# Patient Record
Sex: FEMALE | Race: WHITE | NOT HISPANIC OR LATINO | Employment: OTHER | URBAN - METROPOLITAN AREA
[De-identification: names, ages, dates, MRNs, and addresses within clinical notes are randomized per-mention and may not be internally consistent; named-entity substitution may affect disease eponyms.]

---

## 2017-01-24 ENCOUNTER — TELEPHONE (OUTPATIENT)
Dept: UROLOGY | Facility: CLINIC | Age: 42
End: 2017-01-24

## 2017-01-24 NOTE — TELEPHONE ENCOUNTER
----- Message from Melany Schwab sent at 1/24/2017  3:35 PM CST -----  Contact: pt  _  1st Request  _  2nd Request  _  3rd Request        Who: pt    Why: pt needs advise on her condition. Made her an appt but she is not sure is she needs to see dr diez or where to go from here. Put her on the wait list because her appt is not until 2/22/17    What Number to Call Back:[phone]    When to Expect a call back: (Before the end of the day)   -- if call after 3:00 call back will be tomorrow.

## 2017-02-17 ENCOUNTER — TELEPHONE (OUTPATIENT)
Dept: UROLOGY | Facility: CLINIC | Age: 42
End: 2017-02-17

## 2017-02-17 NOTE — TELEPHONE ENCOUNTER
----- Message from Cheyenne Brooks sent at 2/17/2017  1:55 PM CST -----  _  1st Request  _  2nd Request  _  3rd Request        Who: Devi Azevedo    Why: please call pt concerning her appt on Wednesday, she wants to be admitted into the hospital and i explained to the patient we can not admit her over the phone, but she would like to someone in your office.     What Number to Call Back: [phone]    When to Expect a call back: (Before the end of the day)   -- if the call is after 12:00, the call back will be tomorrow.

## 2017-02-17 NOTE — TELEPHONE ENCOUNTER
Patient called stating that she has put on a significant amount of weight and wants to be admitted in the hospital today for testing. She states that she feels like she is under stress and feels like she needs immediate care. I advised the patient that she should seek ER care immediately, but the patient refused stating that she wants either the provider to admit her today or she will wait for her apt. The patient speech was not clear and at some points inaudible. She was unclear about past dx. She states that she thinks she has water toxicity. I was able to find her endocrinologist in a directory, Dr. Lili Broussard ([phone]) and will try to obtain some records for her. Their office is closed today.

## 2017-02-22 ENCOUNTER — TELEPHONE (OUTPATIENT)
Dept: UROLOGY | Facility: CLINIC | Age: 42
End: 2017-02-22

## 2017-02-22 NOTE — TELEPHONE ENCOUNTER
----- Message from Apryl Ennis sent at 2/22/2017  7:55 AM CST -----  Contact: AMANDA POE [[id number]]  x_  1st Request  _  2nd Request  _  3rd Request        Who: AMANDA POE [[id number]]    Why: Patient would like a call back from clinical staff regarding scheduled appt today. Please give patient a call back at your earliest convenience. Thanks!    What Number to Call Back: [phone]    When to Expect a call back: (Before the end of the day)   -- if the call is after 12:00, the call back will be tomorrow.

## 2017-02-22 NOTE — TELEPHONE ENCOUNTER
Spoke to pt advised her we dont treat fluid intake an skin pealing. Pt states she was told that  would admit her to the hospital for these symptoms. i advised her we wouldn't do this. She states she has seen numerous doctors in [facility] an no one there was able to pin point what is wrong with her. i advise her if she was having all these symptoms i can only suggest her to go to the er. After a 20minute called of stressing to go to the er she stated she will come to [place] to go to [facility] er in hope that they will fine out what is wrong with her. les

## 2017-03-23 ENCOUNTER — OFFICE VISIT (OUTPATIENT)
Dept: ENDOCRINOLOGY | Facility: CLINIC | Age: 42
End: 2017-03-23
Payer: MEDICARE

## 2017-03-23 VITALS
WEIGHT: 143.31 LBS | HEART RATE: 97 BPM | HEIGHT: 65 IN | DIASTOLIC BLOOD PRESSURE: 84 MMHG | BODY MASS INDEX: 23.88 KG/M2 | SYSTOLIC BLOOD PRESSURE: 128 MMHG

## 2017-03-23 DIAGNOSIS — K50.919 CROHN'S DISEASE WITH COMPLICATION, UNSPECIFIED GASTROINTESTINAL TRACT LOCATION: ICD-10-CM

## 2017-03-23 DIAGNOSIS — E03.9 PRIMARY HYPOTHYROIDISM: Primary | ICD-10-CM

## 2017-03-23 DIAGNOSIS — G89.29 OTHER CHRONIC PAIN: ICD-10-CM

## 2017-03-23 DIAGNOSIS — E16.1 REACTIVE HYPOGLYCEMIA: ICD-10-CM

## 2017-03-23 DIAGNOSIS — G47.30 SLEEP APNEA, UNSPECIFIED TYPE: ICD-10-CM

## 2017-03-23 DIAGNOSIS — Q79.62 EHLERS-DANLOS SYNDROME TYPE III: ICD-10-CM

## 2017-03-23 DIAGNOSIS — E55.9 VITAMIN D DEFICIENCY DISEASE: ICD-10-CM

## 2017-03-23 DIAGNOSIS — R60.0 EDEMA OF LOWER EXTREMITY, UNSPECIFIED LATERALITY: ICD-10-CM

## 2017-03-23 DIAGNOSIS — M85.80 LOW BONE MASS: ICD-10-CM

## 2017-03-23 PROCEDURE — 99204 OFFICE O/P NEW MOD 45 MIN: CPT | Mod: S$PBB,GC,, | Performed by: INTERNAL MEDICINE

## 2017-03-23 PROCEDURE — 99999 PR PBB SHADOW E&M-EST. PATIENT-LVL V: CPT | Mod: PBBFAC,GC,, | Performed by: INTERNAL MEDICINE

## 2017-03-23 PROCEDURE — 99215 OFFICE O/P EST HI 40 MIN: CPT | Mod: PBBFAC | Performed by: INTERNAL MEDICINE

## 2017-03-23 RX ORDER — DEXTROAMPHETAMINE SACCHARATE, AMPHETAMINE ASPARTATE MONOHYDRATE, DEXTROAMPHETAMINE SULFATE AND AMPHETAMINE SULFATE 7.5; 7.5; 7.5; 7.5 MG/1; MG/1; MG/1; MG/1
30 CAPSULE, EXTENDED RELEASE ORAL 3 TIMES DAILY
COMMUNITY

## 2017-03-23 RX ORDER — ALPRAZOLAM 2 MG/1
2 TABLET ORAL 4 TIMES DAILY PRN
COMMUNITY

## 2017-03-23 RX ORDER — TIZANIDINE 4 MG/1
4 TABLET ORAL EVERY 6 HOURS PRN
COMMUNITY

## 2017-03-23 RX ORDER — OXYCODONE HYDROCHLORIDE 15 MG/1
15 TABLET ORAL EVERY 6 HOURS PRN
COMMUNITY

## 2017-03-23 RX ORDER — MORPHINE SULFATE 60 MG/1
60 TABLET, FILM COATED, EXTENDED RELEASE ORAL
COMMUNITY

## 2017-03-23 RX ORDER — LEVOTHYROXINE SODIUM 125 UG/1
125 TABLET ORAL DAILY
COMMUNITY

## 2017-03-23 RX ORDER — ACARBOSE 25 MG/1
25 TABLET ORAL
COMMUNITY

## 2017-03-23 RX ORDER — CHOLECALCIFEROL (VITAMIN D3) 25 MCG
185 TABLET ORAL DAILY
COMMUNITY

## 2017-03-23 RX ORDER — BUPROPION HYDROCHLORIDE 300 MG/1
300 TABLET ORAL DAILY
COMMUNITY

## 2017-03-23 RX ORDER — FLUOXETINE HYDROCHLORIDE 20 MG/1
20 CAPSULE ORAL 2 TIMES DAILY
COMMUNITY

## 2017-03-23 NOTE — MR AVS SNAPSHOT
[facility]  [address]  [facility] [address]  Phone: [phone]  Fax: [phone]                  Devi Azevedo   3/23/2017 9:00 AM   Office Visit    Description:  Female : 1975   Provider:  Dipika Bartholomew DO   Department:  [facility]           Reason for Visit     Consult           Diagnoses this Visit        Comments    Primary hypothyroidism    -  Primary     Sleep apnea, unspecified type         Edema of lower extremity, unspecified laterality         Vitamin D deficiency disease         Sonia-Danlos syndrome type III                To Do List           Future Appointments        Provider Department Dept Phone    3/23/2017 11:50 AM LAB, APPOINTMENT [place] [facility] [phone]    3/24/2017 9:00 AM Talita Morris MD [facility] - Neurology Epilepsy [phone]      Goals (5 Years of Data)     None      [facility]     [facility] Care [facility] -  Assistance  Registered nurses in the [facility] provide clinical advisement, health education, appointment booking, and other advisory services.  Call for this free service at [phone].             Medications           Message regarding Medications     Verify the changes and/or additions to your medication regime listed below are the same as discussed with your clinician today.  If any of these changes or additions are incorrect, please notify your healthcare provider.             Verify that the below list of medications is an accurate representation of the medications you are currently taking.  If none reported, the list may be blank. If incorrect, please contact your healthcare provider. Carry this list with you in case of emergency.           Current Medications     buPROPion (WELLBUTRIN XL) 300 MG 24 hr tablet Take 300 mg by mouth once daily.    fluoxetine (PROZAC) 20 MG capsule Take 20 mg by mouth once daily.           Clinical Reference  "Information           Your Vitals Were     BP Pulse Height Weight Last Period BMI    128/84 97 5' 5" (1.651 m) 65 kg (143 lb 4.8 oz) 03/02/2017 23.85 kg/m2      Blood Pressure          Most Recent Value    BP  128/84      Allergies as of 3/23/2017     No Known Allergies      Immunizations Administered on Date of Encounter - 3/23/2017     None      Orders Placed During Today's Visit      Normal Orders This Visit    Ambulatory consult to Internal Medicine     Ambulatory consult to Neurology     Ambulatory consult to Orthopedics     Urinalysis     Future Labs/Procedures Expected by Expires    ALCOHOL,MEDICAL (ETHANOL)  3/23/2017 5/22/2018    Alkaline phosphatase  3/23/2017 5/22/2018    CBC auto differential  3/23/2017 5/22/2018    COMPREHENSIVE METABOLIC PANEL  3/23/2017 5/22/2018    Phosphorus  3/23/2017 5/22/2018    PTH, intact  3/23/2017 5/22/2018    TSH  3/23/2017 5/22/2018    Vitamin D  3/23/2017 5/22/2018      [facility] Sign-Up     Activating your [facility] account is as easy as 1-2-3!     1) Visit [email or web address], select Sign Up Now, enter this activation code and your date of birth, then select Next.  [email or web address]  Expires: 5/7/2017 11:38 AM      2) Create a username and password to use when you visit [facility] in the future and select a security question in case you lose your password and select Next.    3) Enter your e-mail address and click Sign Up!    Additional Information  If you have questions, please e-mail [email or web address] or call [phone] to talk to our [facility] staff. Remember, [facility] is NOT to be used for urgent needs. For medical emergencies, dial 911.         Instructions    - Labs today   - will have to follow up with primary care physician, ortho  and neurology        Smoking Cessation     If you would like to quit smoking:   You may be eligible for free services if you are a [place] resident and started smoking cigarettes before September 1, 1988.  Call the Smoking Cessation " Trust ([facility]) toll free at (112) 617-0947 or (036) 596-4584.   Call 1-800-QUIT-NOW if you do not meet the above criteria.            Language Assistance Services     ATTENTION: Language assistance services are available, free of charge. Please call [phone].      ATENCIÓN: Si habla español, tiene a nava disposición servicios gratuitos de asistencia lingüística. Llame al [phone].     CHÚ Ý: N?u b?n nói Ti?ng Vi?t, có các d?ch v? h? tr? ngôn ng? mi?n phí dành cho b?n. G?i s? [phone].         [facility] complies with applicable Federal civil rights laws and does not discriminate on the basis of race, color, national origin, age, disability, or sex.

## 2017-03-23 NOTE — PROGRESS NOTES
Subjective:      Patient ID: Devi Azevedo is a 41 y.o. female.    Chief Complaint:  Consult  Multiple medical problems. Also note difficult to obtain history.     History of Present Illness  Consult for Weight gain and edema from othro in [place].     History was obtained with the support of her , pt was drowsy at the time of the interview.   Also reported that she may have gone to bed very late last night and had alcohol last night, reports she is an occasional drinks only.    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   States she needs additional shoulder surgery and would like to shift her care here   No formal genetics test, dx based on major criteria     Crohn's Disease  With multiple small bowel obstructions in the past   Mostly self treated with miralax - per pt  During work up for a possible SBO --> imaging showed abd/pelvis --> Phleboliths.   Pt does not take calcium supplementations at this time due to this      Concern about LE edema   Concerned that this is related to parathyroid gland and low albumin.   Lowest noted 3.2 in the past      Reactive Hypoglycemia ( during ER visit BG was 300's then dropped to 50's, pt had had a large carbohydrate meal per notes)   Started on acarbose    BG checked in clinic 107     Anorexia ( age 15- 31, weight 77 lb)   One son   Reports one spontaneous tibial fracture  --> osteopenia history  On Vit D, not calcium due to phleboliths  Menses now regular: every 28-30 days for 3 days     Sleep apnea  Does not use CPAP  "  Uncomfortable     Chronic pain on long term and multiple pain medications   Related to the multiple surgeries pt has had.    also states she they are considering moving to [place], as [place] and [place] laws [place] too strict and lead to limitations in pain control.     Off note, our MA called pt pharmacy for medication list. Pharmacy also voiced their concern over pt scheduled pain medication prescription.       Pt major concerns today are --- fluid retention, " water toxicity", polydipsia (drinks 64 = 8 cups a day). Denies polyuria .    also concerned about possible seizures.         Review of Systems   Constitutional: Negative for fever and unexpected weight change.   HENT: Negative for sore throat.    Eyes: Negative for photophobia.   Respiratory: Negative for shortness of breath.    Cardiovascular: Negative for chest pain.   Gastrointestinal: Negative for nausea.   Endocrine: Positive for polydipsia. Negative for polyuria.   Genitourinary: Negative for dysuria.   Musculoskeletal: Positive for arthralgias.   Skin: Negative for rash.   Neurological: Positive for speech difficulty. Negative for dizziness.   Psychiatric/Behavioral: Negative for behavioral problems.       Objective:   Physical Exam   Constitutional: She is oriented to person, place, and time. She appears well-developed and well-nourished.   HENT:   Head: Normocephalic and atraumatic.   Right Ear: External ear normal.   Left Ear: External ear normal.   Eyes: Conjunctivae and EOM are normal.   Neck: Normal range of motion. Neck supple. No tracheal deviation present. No thyromegaly present.   Cardiovascular: Normal rate and regular rhythm.    Pulmonary/Chest: Effort normal and breath sounds normal.   Abdominal: Soft. She exhibits no distension.   Musculoskeletal:   Very mild pitting edema   Range of motion of small joints limited --> pt reports pain due to fracture.  Left should pain --> per pt, she is scheduled to have surgery on that shoulder   " "  Lymphadenopathy:     She has no cervical adenopathy.   Neurological: She is alert and oriented to person, place, and time.   Skin: Skin is warm and dry.   Psychiatric:   Slow to respond, tangential speech pattern    Nursing note and vitals reviewed.      Lab Review:   Labs from  [facility]   2/7/2017 ( per office visit notes)   ACTH stim test 11/15 --> cortisol 33    Feb 2017  Prolactin 15  Cortisol 3.5 ( 16:33 hrs)   TSH 1.15  FT4 0.63  A1c 5.3  ALT/AST 21/21  Cr 0.75  Luisito 8.6  Na 138  Total protein 7 April 2016   H&H 43.4/13.8  Sed rate 11  CRP 0.18    Assessment:     1. Primary hypothyroidism    2. Sleep apnea, unspecified type    3. Edema of lower extremity, unspecified laterality    4. Vitamin D deficiency disease    5. Sonia-Danlos syndrome type III    6. Other chronic pain    7. Crohn's disease with complication, unspecified gastrointestinal tract location    8. Reactive hypoglycemia    9. Anorexia    10. Osteopenia      Plan:     Pt with multiple medical concerns and chronic pain due to multiple ortho surgeries at this time with no unifying diagnosis.     D/w pt and , she would benefit from having a PCP. They would like to transfer care here.    concerned about possible seizures that may be leading to the current changes. Will refer to neurology for further evaluation.     Due to hx of Sonia-Danlos  type 3 ( dx based on major criteria) - per pt   Now with shoulder pain   Will refer to ortho     Again, encouraged pt that she needs to have a primary care physician.    Endo specific issues   hypothyroidism - Will check TSH and Free T4   " water toxicity"/ polydipsia --> will check BMP and UA for sp gravity   "parathyroid issues" and radiographic finding of Phleboliths.  --> will check PTH   Osteopenia --> recommend pt send in a copy of her more recent BMD   Reactive hypoglycemia - nl A1c, monitor high carb meals, none recently.     Follow up pending " above labs   Again lots of concerns with no clear unifying diagnosis at this time.

## 2017-03-24 ENCOUNTER — OFFICE VISIT (OUTPATIENT)
Dept: NEUROLOGY | Facility: CLINIC | Age: 42
End: 2017-03-24
Payer: MEDICARE

## 2017-03-24 VITALS
SYSTOLIC BLOOD PRESSURE: 106 MMHG | DIASTOLIC BLOOD PRESSURE: 67 MMHG | BODY MASS INDEX: 24.02 KG/M2 | WEIGHT: 144.19 LBS | HEART RATE: 89 BPM | HEIGHT: 65 IN

## 2017-03-24 DIAGNOSIS — F32.A ANXIETY AND DEPRESSION: ICD-10-CM

## 2017-03-24 DIAGNOSIS — F41.9 ANXIETY AND DEPRESSION: ICD-10-CM

## 2017-03-24 DIAGNOSIS — R56.9 SEIZURE: Primary | ICD-10-CM

## 2017-03-24 PROCEDURE — 99213 OFFICE O/P EST LOW 20 MIN: CPT | Mod: PBBFAC | Performed by: PSYCHIATRY & NEUROLOGY

## 2017-03-24 PROCEDURE — 99205 OFFICE O/P NEW HI 60 MIN: CPT | Mod: S$PBB,,, | Performed by: PSYCHIATRY & NEUROLOGY

## 2017-03-24 PROCEDURE — 99999 PR PBB SHADOW E&M-EST. PATIENT-LVL III: CPT | Mod: PBBFAC,,, | Performed by: PSYCHIATRY & NEUROLOGY
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
## 2017-03-24 NOTE — LETTER
March 24, 2017      Dipika Bartholomew DO  [address]  [facility] [address]           [facility] - Neurology Epilepsy  [address]  [facility] [address]  Phone: [phone]  Fax: [phone]          Patient: Devi Azevedo   MR Number: [id number]   YOB: 1975   Date of Visit: 3/24/2017       Dear Dr. Dipika Bartholomew:    Thank you for referring Devi Azevedo to me for evaluation. Attached you will find relevant portions of my assessment and plan of care.    If you have questions, please do not hesitate to call me. I look forward to following Devi Azevedo along with you.    Sincerely,    Talita Morris MD    Enclosure  CC:  No Recipients    If you would like to receive this communication electronically, please contact [email or web address] or [phone] to request more information on [facility] access.    For providers and/or their staff who would like to refer a patient to [facility], please contact us through our one-stop-shop provider referral line, [facility], at [phone].    If you feel you have received this communication in error or would no longer like to receive these types of communications, please e-mail [email or web address]

## 2017-03-27 ENCOUNTER — OFFICE VISIT (OUTPATIENT)
Dept: INTERNAL MEDICINE | Facility: CLINIC | Age: 42
End: 2017-03-27
Payer: MEDICARE

## 2017-03-27 VITALS — WEIGHT: 145.5 LBS | HEIGHT: 65 IN | BODY MASS INDEX: 24.24 KG/M2

## 2017-03-27 DIAGNOSIS — R60.9 EDEMA, UNSPECIFIED TYPE: ICD-10-CM

## 2017-03-27 DIAGNOSIS — R63.1 POLYDIPSIA: Primary | ICD-10-CM

## 2017-03-27 DIAGNOSIS — Q82.8: ICD-10-CM

## 2017-03-27 DIAGNOSIS — E03.8 HYPOTHYROIDISM DUE TO HASHIMOTO'S THYROIDITIS: ICD-10-CM

## 2017-03-27 DIAGNOSIS — R21 SKIN RASH: ICD-10-CM

## 2017-03-27 DIAGNOSIS — G89.4 CHRONIC PAIN ASSOCIATED WITH SIGNIFICANT PSYCHOSOCIAL DYSFUNCTION: ICD-10-CM

## 2017-03-27 DIAGNOSIS — R63.0 LOSS OF APPETITE: ICD-10-CM

## 2017-03-27 DIAGNOSIS — F60.3 BORDERLINE PERSONALITY DISORDER: ICD-10-CM

## 2017-03-27 DIAGNOSIS — F32.A DEPRESSION, UNSPECIFIED DEPRESSION TYPE: ICD-10-CM

## 2017-03-27 DIAGNOSIS — E06.3 HYPOTHYROIDISM DUE TO HASHIMOTO'S THYROIDITIS: ICD-10-CM

## 2017-03-27 DIAGNOSIS — Z79.899 POLYPHARMACY: ICD-10-CM

## 2017-03-27 DIAGNOSIS — F41.1 GAD (GENERALIZED ANXIETY DISORDER): ICD-10-CM

## 2017-03-27 PROCEDURE — 99204 OFFICE O/P NEW MOD 45 MIN: CPT | Mod: S$PBB,GC,, | Performed by: INTERNAL MEDICINE

## 2017-03-27 PROCEDURE — 99213 OFFICE O/P EST LOW 20 MIN: CPT | Mod: PBBFAC | Performed by: INTERNAL MEDICINE

## 2017-03-27 PROCEDURE — 99999 PR PBB SHADOW E&M-EST. PATIENT-LVL III: CPT | Mod: PBBFAC,GC,, | Performed by: INTERNAL MEDICINE

## 2017-03-27 NOTE — MR AVS SNAPSHOT
[facility] - Internal Medicine  [address]  [place] [address]  Phone: [phone]  Fax: [phone]                  Devi Azevedo   3/27/2017 3:30 PM   Office Visit    Description:  Female : 1975   Provider:  ABI Nevarez   Department:  [facility]           Reason for Visit     Establish Care           Diagnoses this Visit        Comments    Polydipsia    -  Primary            To Do List           Future Appointments        Provider Department Dept Phone    2017 2:00 PM Tamia Heredia PA-C [facility] - Orthopedics [phone]      Goals (5 Years of Data)     None      [facility]     [facility] Line -  Assistance  Registered nurses in the [facility] Center provide clinical advisement, health education, appointment booking, and other advisory services.  Call for this free service at [phone].             Medications           Message regarding Medications     Verify the changes and/or additions to your medication regime listed below are the same as discussed with your clinician today.  If any of these changes or additions are incorrect, please notify your healthcare provider.             Verify that the below list of medications is an accurate representation of the medications you are currently taking.  If none reported, the list may be blank. If incorrect, please contact your healthcare provider. Carry this list with you in case of emergency.           Current Medications     acarbose (PRECOSE) 25 MG Tab Take 25 mg by mouth 3 (three) times daily with meals.    alprazolam (XANAX) 2 MG Tab Take 2 mg by mouth 4 (four) times daily as needed.    buPROPion (WELLBUTRIN XL) 300 MG 24 hr tablet Take 300 mg by mouth once daily.    dextroamphetamine-amphetamine (ADDERALL XR) 30 MG 24 hr capsule Take 30 mg by mouth 3 (three) times daily.    fluoxetine (PROZAC) 20 MG capsule Take 20 mg by mouth 2 (two) times daily.      "levothyroxine (SYNTHROID) 125 MCG tablet Take 125 mcg by mouth once daily. Brand name    morphine (MS CONTIN) 60 MG 12 hr tablet Take 60 mg by mouth 6 (six) times daily.    oxycodone (ROXICODONE) 15 MG Tab Take 15 mg by mouth every 6 (six) hours as needed for Pain.    POLYETHYLENE GLYCOL 3350 (MIRALAX ORAL) Take by mouth 3 (three) times daily. Take 1 capful    tizanidine (ZANAFLEX) 4 MG tablet Take 4 mg by mouth every 6 (six) hours as needed.    vitamin D 1000 units Tab Take 185 mg by mouth once daily.           Clinical Reference Information           Your Vitals Were     Height Weight Last Period BMI       5' 5" (1.651 m) 66 kg (145 lb 8.1 oz) 03/02/2017 24.21 kg/m2       Allergies as of 3/27/2017     No Known Allergies      Immunizations Administered on Date of Encounter - 3/27/2017     None      Orders Placed During Today's Visit      Normal Orders This Visit    Ambulatory consult to Dermatology     Ambulatory consult to Rheumatology       Smoking Cessation     If you would like to quit smoking:   You may be eligible for free services if you are a [place] resident and started smoking cigarettes before September 1, 1988.  Call the Smoking Cessation Trust ([facility]) toll free at (051) 449-3614 or (233) 166-2434.   Call 0-800-QUIT-NOW if you do not meet the above criteria.            Language Assistance Services     ATTENTION: Language assistance services are available, free of charge. Please call [phone].      ATENCIÓN: Si habla español, tiene a nava disposición servicios gratuitos de asistencia lingüística. Llfélix al [phone].     [facility] Ý: N?u b?n nói Ti?ng Vi?t, có các d?ch v? h? tr? ngôn ng? mi?n phí dành cho b?n. G?i s? [phone].         [facility] complies with applicable Federal civil rights laws and does not discriminate on the basis of race, color, national origin, age, disability, or sex.        "

## 2017-03-28 ENCOUNTER — TELEPHONE (OUTPATIENT)
Dept: INTERNAL MEDICINE | Facility: CLINIC | Age: 42
End: 2017-03-28

## 2017-03-28 ENCOUNTER — TELEPHONE (OUTPATIENT)
Dept: ENDOCRINOLOGY | Facility: HOSPITAL | Age: 42
End: 2017-03-28

## 2017-03-28 NOTE — TELEPHONE ENCOUNTER
"Pt on LT4 125 mcg daily   Takes it appropriately per pt.        Ref. Range 3/23/2017 11:57   TSH Latest Ref Range: 0.400 - 4.000 uIU/mL 0.073 (L)   Free T4 Latest Ref Range: 0.71 - 1.51 ng/dL 0.95     Also on biotin.   Recommend holding biotin at this time and can repeat the test.       Pt concerned about Phleboliths, polydipsia --> states this may be due to parathyroid gland.     Reviewed labs with pt      Ref. Range 3/23/2017 11:57   PTH Latest Ref Range: 9.0 - 77.0 pg/mL 58.0   Results for AMANDA POE (MRN [id number]) as of 3/28/2017 16:29   Ref. Range 3/23/2017 11:57   Vit D, 25-Hydroxy Latest Ref Range: 30 - 96 ng/mL 38   Results for AMANDA POE (MRN [id number]) as of 3/28/2017 16:29   Ref. Range 3/23/2017 11:57   Calcium Latest Ref Range: 8.7 - 10.5 mg/dL 8.8   Phosphorus Latest Ref Range: 2.7 - 4.5 mg/dL 3.0        Ref. Range 3/23/2017 11:57   Vit D, 25-Hydroxy Latest Ref Range: 30 - 96 ng/mL 38   At this time labs do not indicate abnormal parathyroid etiology     Pt adamant that she needs parathyroid imaging --> US or MRI   She plans to also follow up with ortho April 4th appt. However feels that she needs parathyroid imaging.    Adamant we look up " polydipsia and ortho" on pub med.      She would also like vasopressin levels checked --> d/t " water retention"   "Body cannot maintain homeostasis" " pH is out of wack"     Reviewed with pt --> sodium levels are normal at this time       States her ortho sent her for the above reason's    Asked pt about her ortho 's contact information -   Dr. Rose: [phone]       Pt also mentions she has an infection on her arm that is moving up. --> recommend that she go to the ER or urgent care.   Pt is agreeable      Over 30 mins spent over the phone   "

## 2017-03-28 NOTE — TELEPHONE ENCOUNTER
Called endocrine  , they will have staff doctor review labs and call pt and determine if she needs an appointment at that time   I also called pt  and spoke with him and explained that endocrine will give her  A call about results and once this issue is taken care of we will Procide with further action   Please advise ,    thanks , shirley

## 2017-03-28 NOTE — PROGRESS NOTES
"Subjective:       Patient ID: Devi Azevedo is a 41 y.o. female.    Chief Complaint: Establish Care    HPI     This is a 41 year old lady with pmhx pertinent for Sonia-Danlos syndrome with chronic pain and hx of multiple joint correction surgeries, panic disorder, depression, LISHA, Borderline personality disorder, Hashimoto's thyroiditis with hypothyroidism, ?reactive hypoglycemia and Crohn's disease who presents to [facility] for second opinion for weight gain and fluid retention. She was in her usual state of health until 8/2016 when noticed a "change from her baseline homeostasis" with symptoms of "brain fogging" and "+3 pitting edema due to fluid retention". She followed closely with her endocrinologist and psychiatrist in [place], [place] who were unable to come to a conclusion explaining her symptoms per her. She reports her endocrinologist had discussed her case with her psychiatrist who had planned for a conjoined meeting but patient and spouse were displeased with them discussing her case without her knowledge and had not made it to the meeting. She had also reported discontinuing Wellbutrin on her own one month ago due to thinking that it could be related to the recent change from baseline.     Pmhx: as mentioned above,   Psh: ~31 joint corrective surgeries   Meds: prozac 20mg, xanax 2mg bid+q8hr prn, Amateze, synthroid 125mcg daily, tizanidine 4mg tid prn, Adderall 30mg daily, MS contin 60mg tid, oxyIR 15mg q6h prn for breakthrough    Review of Systems    Constitutional: no fever or chills  ENT: no nasal congestion or sore throat  Respiratory: no cough or shortness of breath  Cardiovascular: no chest pain   Gastrointestinal: no nausea or vomiting, no abdominal pain or abdominal distention   Genitourinary: no hematuria or dysuria  Integument/Breast: + for left forearm rash, no pruritis  Hematologic/Lymphatic: no easy bruising or lymphadenopathy  Musculoskeletal: no arthralgias or myalgias  Neurological: no " dizziness or tremors  Endocrine: no heat or cold intolerance    Objective:      Physical Exam    General: not in apparent distress, attention seeking behavior  Eyes: conjunctivae/corneas clear. PERRL.  Neck: supple, symmetrical, trachea midline, no JVD.  Chest Wall: no tenderness  Extremities: 2+ pitting edema Lt>Rt, left forearm redness with subcutaneous nodules  Skin: multiple scars, + rashes    Lymph Nodes: No cervical or supraclavicular adenopathy  Psych/Behavioral: Alert and oriented, splitting appreciated    Assessment:       1. Polydipsia    2. Polypharmacy    3. Loss of appetite    4. Chronic pain associated with significant psychosocial dysfunction    5. Skin rash    6. Cutis elastica    7. Hypothyroidism due to Hashimoto's thyroiditis    8. Depression, unspecified depression type    9. LISAH (generalized anxiety disorder)    10. Borderline personality disorder    11. Edema, unspecified type        Plan:         #fluid retention and weight gain:  -- unknown etiology  -- UA wnl  -- reports normal 2D echo although gives hx of ?pericaridal effusion  -- concern over polydipsia although sodium wnl on chem panel  -- considering further w/u with normalizing endocrine labs if symptoms persist    #brain fogging and altered mental status:  -- likely secondary to polypharmacy as patient is on multiple neuro suppressing agents including MS contin at tid dosing after reporting being on a fentanyl patch 1 month ago, also on tizanidine and xanax scheduled + prn dosing,  Oxy IR prn  -- counseled about de-escalating on pain regiment but declined     #hypothyroidism  -- secondary to hashimoto's thyroiditis on synthroid with low TSH on 125mcg daily with concern over overdosing and normal fT4  -- patient following with endocrine, will contact in regards to recent labs    #EDS:  -- referral to rheum  -- patient has appt to follow with orthopedics    #depression and LISHA:  -- continue prozac, I am concerned that her mental health  is not treated appropriately after she took herself off of Wellbutrin. Recommend follow up with psych if patient is accepting     #Skin rash:  -- likely due to insect bite as patient reports incident occurring after sleeping at hotel/motel on the way down to [facility]  -- derm referral if symptoms don't improve with anti-histamines as patient is concerned       RTC prn       ABI Black, PGY-2  [phone]

## 2017-04-03 NOTE — PROGRESS NOTES
I, Jammie Marquez, have personally taken the history and physical exam and I agree with Dr. Bartholomew's assessment and plan.

## 2017-04-04 ENCOUNTER — HOSPITAL ENCOUNTER (OUTPATIENT)
Dept: RADIOLOGY | Facility: HOSPITAL | Age: 42
Discharge: HOME | End: 2017-04-04
Attending: ORTHOPAEDIC SURGERY
Payer: MEDICARE

## 2017-04-04 ENCOUNTER — OFFICE VISIT (OUTPATIENT)
Dept: ORTHOPEDICS | Facility: CLINIC | Age: 42
End: 2017-04-04
Payer: MEDICARE

## 2017-04-04 ENCOUNTER — TELEPHONE (OUTPATIENT)
Dept: ORTHOPEDICS | Facility: CLINIC | Age: 42
End: 2017-04-04

## 2017-04-04 VITALS — BODY MASS INDEX: 24.07 KG/M2 | WEIGHT: 144.5 LBS | HEIGHT: 65 IN

## 2017-04-04 DIAGNOSIS — M25.512 LEFT SHOULDER PAIN, UNSPECIFIED CHRONICITY: Primary | ICD-10-CM

## 2017-04-04 DIAGNOSIS — M25.512 LEFT SHOULDER PAIN, UNSPECIFIED CHRONICITY: ICD-10-CM

## 2017-04-04 DIAGNOSIS — Z96.612 STATUS POST SHOULDER REPLACEMENT, LEFT: ICD-10-CM

## 2017-04-04 PROCEDURE — 73030 X-RAY EXAM OF SHOULDER: CPT | Mod: TC,LT

## 2017-04-04 PROCEDURE — 73030 X-RAY EXAM OF SHOULDER: CPT | Mod: 26,LT,, | Performed by: RADIOLOGY

## 2017-04-04 PROCEDURE — 99999 PR PBB SHADOW E&M-EST. PATIENT-LVL III: CPT | Mod: PBBFAC,,, | Performed by: PHYSICIAN ASSISTANT

## 2017-04-04 PROCEDURE — 99203 OFFICE O/P NEW LOW 30 MIN: CPT | Mod: S$PBB,,, | Performed by: PHYSICIAN ASSISTANT

## 2017-04-04 NOTE — MR AVS SNAPSHOT
[facility]  [address]  [facility] [address]  Phone: [phone]                  Devi Azevedo   2017 2:00 PM   Appointment    Description:  Female : 1975   Provider:  Tamia Heredia PA-C   Department:  [facility]                To Do List           Future Appointments        Provider Department Dept Phone    2017 2:00 PM Tamia Heredia PA-C [facility] [phone]      Goals (5 Years of Data)     None      [facility]     [facility] -  Assistance  Unless otherwise directed by your provider, please contact [facility], our nurse care line that is available for  assistance.     Registered nurses in the [facility] Center provide: appointment scheduling, clinical advisement, health education, and other advisory services.  Call: [phone] (toll free)               Medications           Message regarding Medications     Verify the changes and/or additions to your medication regime listed below are the same as discussed with your clinician today.  If any of these changes or additions are incorrect, please notify your healthcare provider.             Verify that the below list of medications is an accurate representation of the medications you are currently taking.  If none reported, the list may be blank. If incorrect, please contact your healthcare provider. Carry this list with you in case of emergency.           Current Medications     acarbose (PRECOSE) 25 MG Tab Take 25 mg by mouth 3 (three) times daily with meals.    alprazolam (XANAX) 2 MG Tab Take 2 mg by mouth 4 (four) times daily as needed.    buPROPion (WELLBUTRIN XL) 300 MG 24 hr tablet Take 300 mg by mouth once daily.    dextroamphetamine-amphetamine (ADDERALL XR) 30 MG 24 hr capsule Take 30 mg by mouth 3 (three) times daily.    fluoxetine (PROZAC) 20 MG capsule Take 20 mg by mouth 2 (two) times daily.     levothyroxine (SYNTHROID) 125  MCG tablet Take 125 mcg by mouth once daily. Brand name    morphine (MS CONTIN) 60 MG 12 hr tablet Take 60 mg by mouth 6 (six) times daily.    oxycodone (ROXICODONE) 15 MG Tab Take 15 mg by mouth every 6 (six) hours as needed for Pain.    POLYETHYLENE GLYCOL 3350 (MIRALAX ORAL) Take by mouth 3 (three) times daily. Take 1 capful    tizanidine (ZANAFLEX) 4 MG tablet Take 4 mg by mouth every 6 (six) hours as needed.    vitamin D 1000 units Tab Take 185 mg by mouth once daily.           Clinical Reference Information           Your Vitals Were     Last Period                   03/02/2017           Allergies as of 4/4/2017     No Known Allergies      Immunizations Administered on Date of Encounter - 4/4/2017     None      Smoking Cessation     If you would like to quit smoking:   You may be eligible for free services if you are a [place] resident and started smoking cigarettes before September 1, 1988.  Call the Smoking Cessation Trust ([facility]) toll free at (031) 056-9814 or (517) 011-4436.   Call 1-800-QUIT-NOW if you do not meet the above criteria.   Contact us via email: [email or web address]   View our website for more information: [email or web address]        Language Assistance Services     ATTENTION: Language assistance services are available, free of charge. Please call [phone].      ATENCIÓN: Si habla español, tiene a nava disposición servicios gratuitos de asistencia lingüística. Llame al [phone].     CHÚ Ý: N?u b?n nói Ti?ng Vi?t, có các d?ch v? h? tr? ngôn ng? mi?n phí dành cho b?n. G?i s? [phone].         [facility] complies with applicable Federal civil rights laws and does not discriminate on the basis of race, color, national origin, age, disability, or sex.

## 2017-04-04 NOTE — TELEPHONE ENCOUNTER
"Patient called from the lab and insisted on speaking to someone "above the person that just saw her". She is requesting additional labs to be ordered including osmolality because she believes that her polydipsia needs to be addressed by orthopedics. I explained to the patient that a crp and esr are being ordered because Tamia wants to rule out infection in her shoulder due to the pain that she's having. She questioned the intelligence of the provider's that she's been seeing and insisted that if I would read about it I would see that this is necessary and possibly "save a life someday". I explained to the patient that we don't want to order tests that are not related to orthopedics as we are responsible for the testing and follow up. She has already been seen by endocrine and primary care prior to this visit and they ordered labwork. She advised me that Dr. Toro sent her to orthopedics for this testing. She wanted my name so she could have him call me. I asked for his name so that I could give him a call to discuss and she became angry and told me that she is not giving me permission to call him and that if I do, that will be a problem. I advised her again that we can only order the labwork that is appropriate to orthopedics and that we are checking to see if there is any infection related to her shoulder pain. She told me that she knows that and she also knows that I won't be able to tell if she has metal toxicity. She asked for my name several times and I gave it to her and told her she is welcome to have Dr. Toro call me back to discuss. She says she will do that. I told her that if she wants to wait to have the labwork done that's ok and she got off of the phone. The crp and esr were drawn. She has a f/u appointment with Dr. Lee to discuss her shoulder pain.   "

## 2017-04-06 ENCOUNTER — TELEPHONE (OUTPATIENT)
Dept: ENDOCRINOLOGY | Facility: CLINIC | Age: 42
End: 2017-04-06

## 2017-04-06 NOTE — TELEPHONE ENCOUNTER
Called pt after d/w Dr. Marquez. Unable to reach, VM left.    No indication for US of the parathyroid gland at this time.     UA pending    Thank you   Dipika Bartholomew DO   Endocrinology Fellow   D/w Dr. Marquez

## 2017-04-07 ENCOUNTER — TELEPHONE (OUTPATIENT)
Dept: ENDOCRINOLOGY | Facility: CLINIC | Age: 42
End: 2017-04-07

## 2017-04-07 NOTE — TELEPHONE ENCOUNTER
----- Message from Lisbeth Gallagher sent at 4/7/2017 12:19 PM CDT -----  Contact: Irvin  Returning a call from Dr. Bartholomew.    Please call [phone].

## 2017-04-10 ENCOUNTER — TELEPHONE (OUTPATIENT)
Dept: ENDOCRINOLOGY | Facility: CLINIC | Age: 42
End: 2017-04-10

## 2017-04-10 NOTE — TELEPHONE ENCOUNTER
"Called pt. Do not recommend checking parathyroid US at this time. Pt is adamant on checking " parathyroid scan for Phleboliths"     Pt states if she went to the [facility], she could have her US of her parathyroid glands and they would be abnormal     Reviewed labs with pt     Ref. Range 3/23/2017 11:57   PTH Latest Ref Range: 9.0 - 77.0 pg/mL 58.0   Results for AMANDA POE (MRN [id number]) as of 3/28/2017 16:29    Ref. Range 3/23/2017 11:57   Vit D, 25-Hydroxy Latest Ref Range: 30 - 96 ng/mL 38   Results for AMANDA POE (MRN [id number]) as of 3/28/2017 16:29    Ref. Range 3/23/2017 11:57   Calcium Latest Ref Range: 8.7 - 10.5 mg/dL 8.8   Phosphorus Latest Ref Range: 2.7 - 4.5 mg/dL 3.0      Urination difficult   " sometimes has to wait for hours"   " sometimes go right away"   Noted Urology note - encourage pt go to the ER     UA pending.     Encourage pt to got to the ER for urinary retention   Very vague sx --> could be UTI etc     Thank you   Dipika Bartholomew DO   Endocrinology Fellow   D/w Dr. Marquez   "

## 2017-04-10 NOTE — TELEPHONE ENCOUNTER
----- Message from Arlen Coats sent at 4/10/2017  1:16 PM CDT -----  Contact: pt  [phone]  [place]   -   Patient  Is returning the  phone call in regards to an pervious conversation .  Patient still has a lot of fluid all over her body. Pt has cyst on her glands .  Pt can't urinate   Thanks,

## 2017-04-17 ENCOUNTER — TELEPHONE (OUTPATIENT)
Dept: ENDOCRINOLOGY | Facility: CLINIC | Age: 42
End: 2017-04-17

## 2017-04-17 NOTE — TELEPHONE ENCOUNTER
----- Message from Emily Thurman sent at 4/13/2017 11:55 AM CDT -----  Contact:  / Irvin Azevedo / [phone]  Pt states she is retaining 41 lbs of fluid and is requesting an appointment on today. The pt is requesting a call at [phone].

## 2017-04-20 ENCOUNTER — TELEPHONE (OUTPATIENT)
Dept: ENDOCRINOLOGY | Facility: CLINIC | Age: 42
End: 2017-04-20

## 2017-04-20 DIAGNOSIS — R35.89 POLYURIA: Primary | ICD-10-CM

## 2017-04-20 DIAGNOSIS — E03.9 PRIMARY HYPOTHYROIDISM: ICD-10-CM

## 2017-04-20 NOTE — TELEPHONE ENCOUNTER
"Called pt this afternoon in regards to " direct admission to the hospital" for " polydipsia" and " weight gain"  "thyroid" issues.     VM left.   Explained to pt that we do not directly admit to the hospital. Encouraged pt to go to the ER for urgent/emergent sx.   Advised pt that we would like repeat TSH and Free T4 in 1-2 weeks   Also encouraged pt to contact us back for any further questions.     Message also sent on pt portal.     Akbar Azevedo,    Hope are are doing well. Dr. Marquez and I called you this afternoon, however were unable to each you.   We received your message in regarding direct admission to the hospital.   Endocrinology does not admit patients to the hospital. HOWEVER, if you are not feeling well we highly encourage you to go to the ER for any urgent or emergent needs.     We would like to repeat the thyroid lab test, if you can check in the next 1-2 weeks that would be great. If you cannot please let us know and we can schedule it for a later time.   I am also waiting for the urine analysis results.     Thank you   Dipika Bartholomew DO   Endocrinology Fellow   D/w Dr. Marquez   "

## 2017-04-20 NOTE — TELEPHONE ENCOUNTER
Have reviewed chart and labs with Dr. Bartholoemw.   Have called and left lengthy message.  Would consider repeating TFTs in 1 - 2 weeks.   Have explained to patient that we do not admit patients.   Needs PCP.

## 2017-04-20 NOTE — TELEPHONE ENCOUNTER
Spoke with pt and her  and they would like to be admitted into the hospital. I spoke with pt in regards to scheduling the UA that Dr. Bartholomew has ordered but pt is requesting for more blood work to be ordered. Pts  states that they explained everything in a letter which was handed over to Dr. Marquez this morning. Please advise.      ----- Message from Lisbeth Gallagher sent at 4/18/2017  3:06 PM CDT -----  Contact: Irvin  Requesting a call back in regards to a plan of action for treatment for Thyroid/weight gain issues.    Please call [phone].

## 2017-04-21 ENCOUNTER — NURSE TRIAGE (OUTPATIENT)
Dept: ADMINISTRATIVE | Facility: CLINIC | Age: 42
End: 2017-04-21

## 2017-04-21 NOTE — TELEPHONE ENCOUNTER
called re pt with multiple medical pblms. Working with endo x 1 mo for hypothyroid labs. Pt is out of pain meds. Dr craig persaud fills rx. Pt has appt on 5/8. rec to call dr persaud for refill until appt. rec ER for pain meds if pt in severe pain. Pt has ellers rick disease. Pt has 40 lbs edema. Denies sob.  states edema started as ascites and now fluid all over.  calling re labs- stating that office did not call back.  states that pt has palpitations. Per office notes request for pt to see PCP. Phone connection poor during call. Call back with questions.   LM with Patricia at dr srinivasan's office re rx at 429 pm 4/21.   Reason for Disposition   Nursing judgment, per information in Reference    Protocols used: ST NO GUIDELINE AVAILABLE - ADVICE PER XOKAPOLAN-X-GU

## 2017-04-21 NOTE — TELEPHONE ENCOUNTER
Called patient regarding message received, patient was instructed per Dr. Bartholomew/Alma recommendation to go to ED for fluid build-up, patient is asking for medication and to be directly admitted to the ED.  Notified Dr. Bartholomew, labs appt scheduled for Monday.

## 2024-10-08 NOTE — PROGRESS NOTES
Patient was referred to ortho for left shoulder pain. Very little information was obtained regarding her shoulder. She kept talking about topics such as polydipsia, fluid retention, third space fluid, and osmolality. She kept insisting that orthopedics find the reason for her polydipsia and that we should manage/treat this. She wanted her osmolality checked. I explained to patient that we are a bone and joint surgical specialty. She would have to discuss this with her PCP. She would not accept this. I attempted to re-direct the conversation back to her shoulder several times, but she wouldn't cooperate. She briefly mentioned that she has had 2 left shoulder replacements and she is concerned it is infected. I ordered x-ray, crp, and esr. I told patient I was going to set an appt for her with a shoulder surgeon. She was very rude the entire encounter. She kept saying that I need to go to school for 2 more years. And that I need to google the same research articles that she has read. She said she wants to see the shoulder surgeon because she needs to talk to someone who is on the same level as her. Several insults were said by her. She says her previous orthopedic surgeon was Dr. Toro. He did her shoulder replacements. She says he referred her to [facility] so we can investigate her polydipsia.     She was given a number to call so she can set up an appt with a PCP.   Patient left clinic to get her labs done. The labs that were ordered are the appropriate and standard labs ortho obtains when infection is a concern. She called from the lab and demanded that we order osmolality labs. Lead NP spoke with patient and explained that is not within the ortho scope. Refer to telephone encounter. She is scheduled to f/u with Dr. Lee on 4/26/17 for her left shoulder pain.    Detail Level: Zone Patient Specific Otc Recommendations (Will Not Stick From Patient To Patient): Patient informed to use Joseof soap.

## 2024-12-18 NOTE — PROGRESS NOTES
I have personally taken the history and examined Devi Azevedo and agree with the findings, assessment and plan. Discussed care with patient and  with resident and Nurse clinic director present.  Has seen Neurology and Endocrinology has at [facility] also.  Chart reviewed.   Spoke with pt that pt lives in [place] and only comes in stay 3 to 4 weeks.  Pt will be returning end of March and will schedule an apt.  Pt is seeing another doctor in [place] and had some blood work done.